# Patient Record
Sex: MALE | Race: WHITE | NOT HISPANIC OR LATINO
[De-identification: names, ages, dates, MRNs, and addresses within clinical notes are randomized per-mention and may not be internally consistent; named-entity substitution may affect disease eponyms.]

---

## 2024-01-22 ENCOUNTER — NON-APPOINTMENT (OUTPATIENT)
Age: 74
End: 2024-01-22

## 2024-01-22 ENCOUNTER — APPOINTMENT (OUTPATIENT)
Dept: ELECTROPHYSIOLOGY | Facility: CLINIC | Age: 74
End: 2024-01-22
Payer: COMMERCIAL

## 2024-01-22 VITALS
HEIGHT: 72 IN | OXYGEN SATURATION: 97 % | WEIGHT: 244 LBS | DIASTOLIC BLOOD PRESSURE: 83 MMHG | SYSTOLIC BLOOD PRESSURE: 119 MMHG | HEART RATE: 96 BPM | BODY MASS INDEX: 33.05 KG/M2

## 2024-01-22 DIAGNOSIS — R55 SYNCOPE AND COLLAPSE: ICD-10-CM

## 2024-01-22 DIAGNOSIS — I10 ESSENTIAL (PRIMARY) HYPERTENSION: ICD-10-CM

## 2024-01-22 DIAGNOSIS — I48.19 OTHER PERSISTENT ATRIAL FIBRILLATION: ICD-10-CM

## 2024-01-22 DIAGNOSIS — Z78.9 OTHER SPECIFIED HEALTH STATUS: ICD-10-CM

## 2024-01-22 DIAGNOSIS — E78.5 HYPERLIPIDEMIA, UNSPECIFIED: ICD-10-CM

## 2024-01-22 PROCEDURE — 99204 OFFICE O/P NEW MOD 45 MIN: CPT | Mod: 25

## 2024-01-22 PROCEDURE — 93000 ELECTROCARDIOGRAM COMPLETE: CPT

## 2024-01-22 RX ORDER — ROSUVASTATIN CALCIUM 5 MG/1
5 TABLET, FILM COATED ORAL DAILY
Qty: 90 | Refills: 1 | Status: ACTIVE | COMMUNITY
Start: 2024-01-22

## 2024-01-22 RX ORDER — RIVAROXABAN 20 MG/1
20 TABLET, FILM COATED ORAL
Qty: 30 | Refills: 5 | Status: ACTIVE | COMMUNITY
Start: 2024-01-22

## 2024-01-22 RX ORDER — DILTIAZEM HYDROCHLORIDE 120 MG/1
120 CAPSULE, EXTENDED RELEASE ORAL DAILY
Qty: 30 | Refills: 2 | Status: ACTIVE | COMMUNITY
Start: 2024-01-22

## 2024-01-22 RX ORDER — LOSARTAN POTASSIUM 50 MG/1
50 TABLET, FILM COATED ORAL DAILY
Qty: 90 | Refills: 3 | Status: ACTIVE | COMMUNITY
Start: 2024-01-22

## 2024-01-22 RX ORDER — TADALAFIL 5 MG/1
5 TABLET ORAL
Refills: 0 | Status: ACTIVE | COMMUNITY
Start: 2024-01-22

## 2024-01-22 NOTE — PHYSICAL EXAM
[Well Developed] : well developed [Well Nourished] : well nourished [No Acute Distress] : no acute distress [Normal Conjunctiva] : normal conjunctiva [Normal Venous Pressure] : normal venous pressure [Normal S1, S2] : normal S1, S2 [No Carotid Bruit] : no carotid bruit [No Murmur] : no murmur [No Rub] : no rub [No Gallop] : no gallop [Irregularly Irregular] : irregularly irregular [Clear Lung Fields] : clear lung fields [Good Air Entry] : good air entry [No Respiratory Distress] : no respiratory distress  [Soft] : abdomen soft [Non Tender] : non-tender [No Masses/organomegaly] : no masses/organomegaly [Normal Bowel Sounds] : normal bowel sounds [Normal Gait] : normal gait [No Edema] : no edema [No Cyanosis] : no cyanosis [No Clubbing] : no clubbing [No Varicosities] : no varicosities [No Rash] : no rash [No Skin Lesions] : no skin lesions [Moves all extremities] : moves all extremities [No Focal Deficits] : no focal deficits [Normal Speech] : normal speech [Alert and Oriented] : alert and oriented [Normal memory] : normal memory

## 2024-01-23 NOTE — HISTORY OF PRESENT ILLNESS
[FreeTextEntry1] : Lupillo Colvin is a 72y/o man with Hx of HTN, HLD, persistent afib, and recurrent syncope who presents today for initial evaluation. Admits long history of recurrent syncope, always with prodromal of feeling dizzy/lightheaded, nauseous or in pain, or diaphoresis. Explains he has had past episodes in the setting of prolonged standing as well as in the setting of extreme pain. His most recent episode occurred while sitting at a restaurant after over eating and he began to feel nauseous/diaphoretic and dizzy. He denies any falls with injury to head. He also admits having afib for > 6 years with attempted DCCV at [facility] in the past which was unsuccessful. Has been maintained on Xarelto since that time without s/s of bleeding. Denies chest pain, palpitations, or SOB.

## 2024-01-23 NOTE — DISCUSSION/SUMMARY
[EKG obtained to assist in diagnosis and management of assessed problem(s)] : EKG obtained to assist in diagnosis and management of assessed problem(s) [FreeTextEntry1] : Impression:  1. Syncope: EKG performed today to assess for presence of conduction disease and reveals afib. Recurrent syncope with prodrome, suspect vasovagal. Instructed on safety mechanisms such as staying well hydrated, utilizing salt, avoiding prolonged standing with knees locked, and to lie down with feet elevated if symptoms of near syncope occur. Can also consider use of compression socks and should avoid known triggers for syncope such as alcohol and warm or crowded environments. Consider Holter to ensure no evidence of cardioinhibitory malignant syncope, pt refusing at this time.   2. HTN: resume oral antihypertensives as prescribed. Encouraged heart healthy diet, sodium restriction, and weight loss. Continue regular f/u with Cardiologist for further HTN management.  3. HLD: resume statin therapy as prescribed and regular f/u with Cardiologist for routine lipid monitoring and management.  4. Persistent afib: long standing persistent afib with failed DCCV attempt. On Xarelto. Discussed improved afib management strategies such as meds vs ablation but prefers to avoid at this time.   Will continue f/u with Cardiologist and may RTO as needed or if any new or worsening symptoms or findings occur.